# Patient Record
Sex: MALE | Race: WHITE | NOT HISPANIC OR LATINO | Employment: FULL TIME | URBAN - METROPOLITAN AREA
[De-identification: names, ages, dates, MRNs, and addresses within clinical notes are randomized per-mention and may not be internally consistent; named-entity substitution may affect disease eponyms.]

---

## 2024-08-04 ENCOUNTER — NURSE TRIAGE (OUTPATIENT)
Dept: CALL CENTER | Facility: HOSPITAL | Age: 29
End: 2024-08-04

## 2024-08-04 NOTE — TELEPHONE ENCOUNTER
"Caller has been treating poison ivy at home he is asking if Deo Trung  can prescribe medication.  Home care advice given.  Advised Thaddeussammarquis  would be able to prescribed medication by provider.            Reason for Disposition   Mild rash from poison ivy, oak or sumac    Additional Information   Negative: Doesn't match the SYMPTOMS of poison ivy, oak, or sumac   Negative: [1] Difficulty breathing or severe coughing AND [2] followed exposure to burning weeds   Negative: Patient sounds very sick or weak to the triager   Negative: [1] Severe poison ivy, oak, or sumac reaction in the past AND [2] face or genitals involved   Negative: Rash involves more than 20% of the body   Negative: Large blisters or oozing sores   Negative: [1] Fever AND [2] area is very tender to touch   Negative: [1] Fever AND [2] spreading red area or streak from rash   Negative: [1] Skin around the rash has become red AND [2] larger than 2 inches (5 cm)   Negative: MODERATE to SEVERE itching (e.g., interferes with work, school, sleep, or other activities)   Negative: Severe poison ivy, oak, or sumac reaction in the past   Negative: [1] Face or genitals are involved AND [2] more than a small rash   Negative: [1] Looks infected (e.g., soft yellow scabs, pus or spreading redness) AND [2] no fever   Negative: [1] Taking oral steroids > 24 hours AND [2] rash becoming worse   Negative: Rash lasts > 3 weeks    Answer Assessment - Initial Assessment Questions  1. APPEARANCE of RASH: \"Describe the rash.\"          Poison Ivy    2. LOCATION: \"Where is the rash located?\"  (e.g., face, genitals, hands, legs)      Splotches all over  Calves are the worst  3. SIZE: \"How large is the rash?\"       *No Answer*  4. ONSET: \"When did the rash begin?\"       Wednesday evening  5. ITCHING: \"Does the rash itch?\" If Yes, ask: \"How bad is it?\"    - MILD - doesn't interfere with normal activities    - MODERATE-SEVERE: interferes with work, school, sleep, or " "other activities      mild  6. EXPOSURE:  \"How were you exposed to the plant (poison ivy, poison oak, sumac)\"  \"When were you exposed?\"       Moved a tree from the roadway  7. PAST HISTORY: \"Have you had a poison ivy rash before?\" If Yes, ask: \"How bad was it?\"         no  8. PREGNANCY: \"Is there any chance you are pregnant?\" \"When was your last menstrual period?\"    na    Protocols used: Poison Ivy - Oak - Sumac-ADULT-    " 21-Sep-2022 02:30